# Patient Record
Sex: MALE | Race: WHITE | URBAN - METROPOLITAN AREA
[De-identification: names, ages, dates, MRNs, and addresses within clinical notes are randomized per-mention and may not be internally consistent; named-entity substitution may affect disease eponyms.]

---

## 2020-11-23 ENCOUNTER — IMPORTED ENCOUNTER (OUTPATIENT)
Dept: URBAN - METROPOLITAN AREA CLINIC 50 | Facility: CLINIC | Age: 80
End: 2020-11-23

## 2021-04-17 ASSESSMENT — VISUAL ACUITY
OS_CC: J3@ 13 IN
OD_SC: 20/20
OS_SC: 20/25
OD_CC: J3@ 13 IN

## 2021-04-17 ASSESSMENT — TONOMETRY
OD_IOP_MMHG: 20
OS_IOP_MMHG: 20

## 2021-11-24 ENCOUNTER — COMPREHENSIVE EXAM (OUTPATIENT)
Dept: URBAN - METROPOLITAN AREA CLINIC 53 | Facility: CLINIC | Age: 81
End: 2021-11-24

## 2021-11-24 DIAGNOSIS — H16.223: ICD-10-CM

## 2021-11-24 DIAGNOSIS — H35.373: ICD-10-CM

## 2021-11-24 DIAGNOSIS — H43.813: ICD-10-CM

## 2021-11-24 PROCEDURE — 92014 COMPRE OPH EXAM EST PT 1/>: CPT

## 2021-11-24 PROCEDURE — 92134 CPTRZ OPH DX IMG PST SGM RTA: CPT

## 2021-11-24 ASSESSMENT — VISUAL ACUITY
OS_SC: 20/25
OD_SC: 20/20
OU_SC: J1

## 2021-11-24 ASSESSMENT — TONOMETRY
OS_IOP_MMHG: 20
OD_IOP_MMHG: 20

## 2021-11-24 NOTE — PATIENT DISCUSSION
History of intermittent vertigo and diplopia x 1 year. Patient was advised to have carotid ultrasound. Patient has not scheduled ultrasound. Recommended patient to follow up with PCP and Cardiologist to go forward with carotid ultrasound. Advised to go to ER if any TIA/stroke symptoms. Patient expresses understanding. Will send today's note to PCP.

## 2023-01-25 ENCOUNTER — COMPREHENSIVE EXAM (OUTPATIENT)
Dept: URBAN - METROPOLITAN AREA CLINIC 53 | Facility: CLINIC | Age: 83
End: 2023-01-25

## 2023-01-25 DIAGNOSIS — H16.223: ICD-10-CM

## 2023-01-25 DIAGNOSIS — H43.813: ICD-10-CM

## 2023-01-25 DIAGNOSIS — H35.373: ICD-10-CM

## 2023-01-25 PROCEDURE — 92134 CPTRZ OPH DX IMG PST SGM RTA: CPT

## 2023-01-25 PROCEDURE — 92014 COMPRE OPH EXAM EST PT 1/>: CPT

## 2023-01-25 ASSESSMENT — VISUAL ACUITY
OD_SC: 20/20
OS_SC: 20/20-1

## 2023-01-25 ASSESSMENT — TONOMETRY
OS_IOP_MMHG: 19
OD_IOP_MMHG: 20

## 2023-01-25 NOTE — PATIENT DISCUSSION
History of intermittent vertigo and diplopia x 1 year. Patient states that double vision had to do with vertigo.  patient states that it has been resolved with psychical therapy and he does still see his cardiologist.

## 2024-02-14 ENCOUNTER — COMPREHENSIVE EXAM (OUTPATIENT)
Dept: URBAN - METROPOLITAN AREA CLINIC 53 | Facility: CLINIC | Age: 84
End: 2024-02-14

## 2024-02-14 DIAGNOSIS — H53.2: ICD-10-CM

## 2024-02-14 DIAGNOSIS — H43.813: ICD-10-CM

## 2024-02-14 DIAGNOSIS — H16.223: ICD-10-CM

## 2024-02-14 DIAGNOSIS — H35.373: ICD-10-CM

## 2024-02-14 DIAGNOSIS — H02.834: ICD-10-CM

## 2024-02-14 DIAGNOSIS — H02.831: ICD-10-CM

## 2024-02-14 PROCEDURE — 92134 CPTRZ OPH DX IMG PST SGM RTA: CPT

## 2024-02-14 PROCEDURE — 99214 OFFICE O/P EST MOD 30 MIN: CPT

## 2024-02-14 PROCEDURE — 76514 ECHO EXAM OF EYE THICKNESS: CPT

## 2024-02-14 ASSESSMENT — TONOMETRY
OS_IOP_MMHG: 23
OS_IOP_MMHG: 18
OD_IOP_MMHG: 21
OD_IOP_MMHG: 14

## 2024-02-14 ASSESSMENT — VISUAL ACUITY
OD_SC: 20/25-1
OU_SC: 20/20-1
OS_SC: 20/25

## 2024-02-14 ASSESSMENT — PACHYMETRY
OD_CT_UM: 670
OS_CT_UM: 619

## 2025-03-10 ENCOUNTER — COMPREHENSIVE EXAM (OUTPATIENT)
Age: 85
End: 2025-03-10

## 2025-03-10 DIAGNOSIS — H35.373: ICD-10-CM

## 2025-03-10 DIAGNOSIS — H01.004: ICD-10-CM

## 2025-03-10 DIAGNOSIS — H01.001: ICD-10-CM

## 2025-03-10 DIAGNOSIS — H43.813: ICD-10-CM

## 2025-03-10 DIAGNOSIS — H16.223: ICD-10-CM

## 2025-03-10 DIAGNOSIS — H02.834: ICD-10-CM

## 2025-03-10 DIAGNOSIS — H02.831: ICD-10-CM

## 2025-03-10 PROCEDURE — 99214 OFFICE O/P EST MOD 30 MIN: CPT

## 2025-03-10 PROCEDURE — 92134 CPTRZ OPH DX IMG PST SGM RTA: CPT
